# Patient Record
Sex: FEMALE | NOT HISPANIC OR LATINO | URBAN - METROPOLITAN AREA
[De-identification: names, ages, dates, MRNs, and addresses within clinical notes are randomized per-mention and may not be internally consistent; named-entity substitution may affect disease eponyms.]

---

## 2017-01-31 RX ORDER — SITAGLIPTIN 100 MG/1
TABLET, FILM COATED ORAL
Qty: 30 TAB | Refills: 1 | Status: SHIPPED | OUTPATIENT
Start: 2017-01-31 | End: 2017-04-16 | Stop reason: SDUPTHER

## 2017-01-31 NOTE — TELEPHONE ENCOUNTER
Patient has not been seen since 3/2016 and does not have appointment scheduled. Needs to be seen for diabetes.

## 2017-03-17 RX ORDER — NIFEDIPINE 90 MG/1
TABLET, FILM COATED, EXTENDED RELEASE ORAL
Qty: 90 TAB | Refills: 3 | Status: SHIPPED | OUTPATIENT
Start: 2017-03-17

## 2017-04-17 RX ORDER — SITAGLIPTIN 100 MG/1
TABLET, FILM COATED ORAL
Qty: 30 TAB | Refills: 5 | Status: SHIPPED | OUTPATIENT
Start: 2017-04-17 | End: 2017-04-27 | Stop reason: SDUPTHER

## 2017-04-27 RX ORDER — SITAGLIPTIN 100 MG/1
TABLET, FILM COATED ORAL
Qty: 30 TAB | Refills: 1 | Status: SHIPPED | OUTPATIENT
Start: 2017-04-27

## 2017-07-24 RX ORDER — ERGOCALCIFEROL 1.25 MG/1
CAPSULE ORAL
Qty: 24 CAP | Refills: 3 | Status: SHIPPED | OUTPATIENT
Start: 2017-07-24

## 2017-08-28 NOTE — PATIENT DISCUSSION
(U01.298) Keratoconjunct sicca, not specified as Sjogren's, bilateral - Assesment : Examination revealed Dry Eye Syndrome OU. - Plan : Recommend artificial tears 2-3 times daily and genteal gel drops QHS OU. Monitor for changes. Advised patient to call our office with decreased vision or increased symptoms.   RV 1 year Exam.

## 2017-08-28 NOTE — PATIENT DISCUSSION
(S96.022) Vitreous degeneration, bilateral - Assesment : Examination revealed PVD OU. - Plan : Monitor for changes. Advised patient to call our office with decreased vision or an increase in flashes and/or floaters.

## 2017-09-06 RX ORDER — ATORVASTATIN CALCIUM 20 MG/1
TABLET, FILM COATED ORAL
Qty: 30 TAB | Refills: 5 | Status: SHIPPED | OUTPATIENT
Start: 2017-09-06

## 2018-11-21 ENCOUNTER — IMPORTED ENCOUNTER (OUTPATIENT)
Dept: URBAN - METROPOLITAN AREA CLINIC 1 | Facility: CLINIC | Age: 58
End: 2018-11-21

## 2018-11-21 PROBLEM — Z79.84: Noted: 2018-11-21

## 2018-11-21 PROBLEM — H25.813: Noted: 2018-11-21

## 2018-11-21 PROBLEM — E11.9: Noted: 2018-11-21

## 2018-11-21 PROBLEM — H43.811: Noted: 2018-11-21

## 2018-11-21 PROCEDURE — 92004 COMPRE OPH EXAM NEW PT 1/>: CPT

## 2018-11-21 PROCEDURE — 92015 DETERMINE REFRACTIVE STATE: CPT

## 2018-11-26 NOTE — PATIENT DISCUSSION
(M23.814) Keratoconjunct sicca, not specified as Sjogren's, bilateral - Assesment : Examination revealed Dry Eye Syndrome OU. - Plan : Recommend continue  artificial tears 2-3 times daily and genteal gel drops QHS OU. Monitor for changes. Advised patient to call our office with decreased vision or increased symptoms.    RTC 1 year/EXAM

## 2018-11-26 NOTE — PATIENT DISCUSSION
(B95.358) Other secondary cataract, bilateral - Assesment : Significant posterior capsule opacification present OU. Patient is currently asymptomatic and functioning well. - Plan : Monitor for Changes. Advised patient to call our office with decreased vision or increased symptoms.

## 2018-11-26 NOTE — PATIENT DISCUSSION
(H45.074) Vitreous degeneration, right eye - Assesment : Examination revealed a posterior vitreous detachment OU. - Plan : Handouts given on posterior vitreous detachment and risk factors discussed for retinal detachment development. Advised to call immediately with any changes.

## 2019-12-26 ENCOUNTER — IMPORTED ENCOUNTER (OUTPATIENT)
Dept: URBAN - METROPOLITAN AREA CLINIC 1 | Facility: CLINIC | Age: 59
End: 2019-12-26

## 2019-12-26 PROBLEM — H25.813: Noted: 2019-12-26

## 2019-12-26 PROBLEM — E11.9: Noted: 2019-12-26

## 2019-12-26 PROBLEM — Z79.84: Noted: 2019-12-26

## 2019-12-26 PROBLEM — H43.811: Noted: 2019-12-26

## 2019-12-26 PROCEDURE — 92014 COMPRE OPH EXAM EST PT 1/>: CPT

## 2019-12-26 PROCEDURE — 92015 DETERMINE REFRACTIVE STATE: CPT

## 2019-12-26 NOTE — PATIENT DISCUSSION
1.  DM Type II (Oral Medication) without sign of diabetic retinopathy and no blot heme on dilated retinal examination today OU No Macular Edema:  Discussed the pathophysiology of diabetes and its effect on the eye and risk of blindness. Stressed the importance of strong glucose control. Advised of importance of at least yearly dilated examinations but to contact us immediately for any problems or concerns. 2. Cataract OU: Observe for now without intervention. The patient was advised to contact us if any change or worsening of vision3. PVD w/o Tear OD - RD precautions. MRX for glasses given. Return for an appointment in 1 year 30/glare with Dr. Sara Frederick.

## 2020-08-27 ENCOUNTER — TELEPHONE (OUTPATIENT)
Dept: INTERNAL MEDICINE CLINIC | Age: 60
End: 2020-08-27

## 2020-12-28 ENCOUNTER — IMPORTED ENCOUNTER (OUTPATIENT)
Dept: URBAN - METROPOLITAN AREA CLINIC 1 | Facility: CLINIC | Age: 60
End: 2020-12-28

## 2020-12-28 PROBLEM — H43.811: Noted: 2020-12-28

## 2020-12-28 PROBLEM — H25.813: Noted: 2020-12-28

## 2020-12-28 PROBLEM — Z79.84: Noted: 2020-12-28

## 2020-12-28 PROBLEM — E11.9: Noted: 2020-12-28

## 2020-12-28 PROCEDURE — 92015 DETERMINE REFRACTIVE STATE: CPT

## 2020-12-28 PROCEDURE — 92014 COMPRE OPH EXAM EST PT 1/>: CPT

## 2020-12-28 NOTE — PATIENT DISCUSSION
1.  DM Type II without sign of diabetic retinopathy and no blot heme on dilated retinal examination today OU No Macular Edema:  Discussed the pathophysiology of diabetes and its effect on the eye and risk of blindness. Stressed the importance of strong glucose control. Advised of importance of at least yearly dilated examinations but to contact us immediately for any problems or concerns. 2. Cataract OU: Observe for now without intervention. The patient was advised to contact us if any change or worsening of vision3. PVD w/o Tear OD - RD precautions. 4.  Return for an appointment in 1 year for 30 glare with Dr. Keily Melendez.

## 2022-01-06 ENCOUNTER — IMPORTED ENCOUNTER (OUTPATIENT)
Dept: URBAN - METROPOLITAN AREA CLINIC 1 | Facility: CLINIC | Age: 62
End: 2022-01-06

## 2022-01-06 PROBLEM — Z79.84: Noted: 2022-01-06

## 2022-01-06 PROBLEM — H25.813: Noted: 2022-01-06

## 2022-01-06 PROBLEM — H43.811: Noted: 2022-01-06

## 2022-01-06 PROBLEM — E11.9: Noted: 2022-01-06

## 2022-01-06 PROCEDURE — 92015 DETERMINE REFRACTIVE STATE: CPT

## 2022-01-06 PROCEDURE — 92014 COMPRE OPH EXAM EST PT 1/>: CPT

## 2022-01-06 NOTE — PATIENT DISCUSSION
1.  DM Type II (oral meds) without sign of diabetic retinopathy and no blot heme on dilated retinal examination today OU No Macular Edema:  Discussed the pathophysiology of diabetes and its effect on the eye and risk of blindness. Stressed the importance of strong glucose control. Advised of importance of at least yearly dilated examinations but to contact us immediately for any problems or concerns. 2. Cataract OU - Visually Significant secondary to glare discussed the risks benefits alternatives and limitations of cataract surgery. The patient stated a full understanding and would like to hold on surgery at this time. PT can call if desires and patient will need to return for preop appointment with cataract measurements and to have any additional questions answered and start pre-operative eye drops as directed. Phaco PCL OS then ODOtherwise follow-up 6 months for DFE/glare. 3. PVD w/o Tear OD - old/stable. Return for an appointment in 6 months for DFE/glare with Dr. Divine Alford.

## 2022-04-02 ASSESSMENT — VISUAL ACUITY
OD_GLARE: 20/150
OS_CC: J1+
OS_GLARE: 20/100
OD_CC: J1
OD_CC: J1
OS_SC: 20/25
OS_CC: J1
OS_SC: 20/25
OD_GLARE: 20/100
OS_GLARE: 20/150
OS_SC: 20/25
OD_GLARE: 20/100
OS_SC: 20/25
OD_SC: 20/25
OD_SC: 20/25
OS_GLARE: 20/100
OD_SC: 20/25
OS_GLARE: 20/150
OD_CC: J1+
OD_SC: 20/20-1
OS_CC: J1
OD_GLARE: 20/150

## 2022-04-02 ASSESSMENT — TONOMETRY
OS_IOP_MMHG: 16
OS_IOP_MMHG: 16
OD_IOP_MMHG: 15
OS_IOP_MMHG: 15
OD_IOP_MMHG: 16
OD_IOP_MMHG: 15
OS_IOP_MMHG: 16
OD_IOP_MMHG: 16

## 2022-07-07 ENCOUNTER — FOLLOW UP (OUTPATIENT)
Dept: URBAN - METROPOLITAN AREA CLINIC 1 | Facility: CLINIC | Age: 62
End: 2022-07-07

## 2022-07-07 DIAGNOSIS — H25.813: ICD-10-CM

## 2022-07-07 PROCEDURE — 92015 DETERMINE REFRACTIVE STATE: CPT

## 2022-07-07 PROCEDURE — 99213 OFFICE O/P EST LOW 20 MIN: CPT

## 2022-07-07 ASSESSMENT — VISUAL ACUITY
OS_CC: 20/30
OD_BAT: 20/100
OD_CC: 20/40
OS_BAT: 20/100

## 2022-07-07 ASSESSMENT — TONOMETRY
OD_IOP_MMHG: 15
OS_IOP_MMHG: 15

## 2022-07-15 ENCOUNTER — PRE-OP/H&P (OUTPATIENT)
Dept: URBAN - METROPOLITAN AREA CLINIC 1 | Facility: CLINIC | Age: 62
End: 2022-07-15

## 2022-07-15 VITALS
SYSTOLIC BLOOD PRESSURE: 164 MMHG | HEART RATE: 69 BPM | WEIGHT: 152 LBS | BODY MASS INDEX: 26.93 KG/M2 | DIASTOLIC BLOOD PRESSURE: 76 MMHG | HEIGHT: 63 IN

## 2022-07-15 DIAGNOSIS — H25.813: ICD-10-CM

## 2022-07-15 PROCEDURE — 92136 OPHTHALMIC BIOMETRY: CPT

## 2022-07-15 PROCEDURE — 92025 CPTRIZED CORNEAL TOPOGRAPHY: CPT

## 2022-07-15 PROCEDURE — 99499 UNLISTED E&M SERVICE: CPT

## 2022-07-15 ASSESSMENT — VISUAL ACUITY
OD_CC: 20/40
OS_BAT: 20/100
OD_BAT: 20/100
OS_CC: 20/40

## 2022-07-15 ASSESSMENT — KERATOMETRY
OS_K1POWER_DIOPTERS: 45.50
OS_AXISANGLE_DEGREES: 141
OD_K1POWER_DIOPTERS: 45.75
OD_K2POWER_DIOPTERS: 46.75
OD_AXISANGLE2_DEGREES: 146
OS_AXISANGLE2_DEGREES: 51
OD_AXISANGLE_DEGREES: 056
OS_K2POWER_DIOPTERS: 46.50

## 2022-07-15 ASSESSMENT — TONOMETRY
OS_IOP_MMHG: 15
OD_IOP_MMHG: 15

## 2022-07-18 NOTE — PATIENT DISCUSSION
1.  DM Type II without sign of diabetic retinopathy and no blot heme on dilated retinal examination today OU No Macular Edema. Discussed the pathophysiology of diabetes and its effect on the eye and risk of blindness. Stressed the importance of strong glucose control. Advised of importance of at least yearly dilated examinations but to contact us immediately for any problems or concerns. 2. Type II diabetes controlled by oral medications. 3.  PVD w/o Tear OD- RD precautions. Condition discussed w/ pt and reassurance given. Observe. 4. Cataract OU: Observe for now without intervention. The patient was advised to contact us if any change or worsening of vision5. Return for an appointment for 30/glare in 1 year with Dr. Mayra Mace. Double O-Z Flap Text: The defect edges were debeveled with a #15 scalpel blade.  Given the location of the defect, shape of the defect and the proximity to free margins a Double O-Z flap was deemed most appropriate.  Using a sterile surgical marker, an appropriate transposition flap was drawn incorporating the defect and placing the expected incisions within the relaxed skin tension lines where possible. The area thus outlined was incised deep to adipose tissue with a #15 scalpel blade.  The skin margins were undermined to an appropriate distance in all directions utilizing iris scissors.

## 2022-08-03 ENCOUNTER — SURGERY/PROCEDURE (OUTPATIENT)
Dept: URBAN - METROPOLITAN AREA SURGERY 1 | Facility: SURGERY | Age: 62
End: 2022-08-03

## 2022-08-03 DIAGNOSIS — H25.812: ICD-10-CM

## 2022-08-03 PROBLEM — Z96.1: Noted: 2022-08-03

## 2022-08-03 PROBLEM — Z96.1: Noted: 2022-08-17

## 2022-08-03 PROCEDURE — 66984 XCAPSL CTRC RMVL W/O ECP: CPT

## 2022-08-03 ASSESSMENT — KERATOMETRY
OD_AXISANGLE_DEGREES: 056
OS_K1POWER_DIOPTERS: 45.50
OS_K2POWER_DIOPTERS: 46.50
OS_AXISANGLE2_DEGREES: 51
OD_AXISANGLE2_DEGREES: 146
OS_AXISANGLE_DEGREES: 141
OD_K1POWER_DIOPTERS: 45.75
OD_K2POWER_DIOPTERS: 46.75

## 2022-08-04 ENCOUNTER — POST-OP (OUTPATIENT)
Dept: URBAN - METROPOLITAN AREA CLINIC 1 | Facility: CLINIC | Age: 62
End: 2022-08-04

## 2022-08-04 DIAGNOSIS — Z96.1: ICD-10-CM

## 2022-08-04 PROCEDURE — 99024 POSTOP FOLLOW-UP VISIT: CPT

## 2022-08-04 ASSESSMENT — KERATOMETRY
OD_AXISANGLE_DEGREES: 056
OS_K1POWER_DIOPTERS: 45.50
OS_AXISANGLE_DEGREES: 141
OD_AXISANGLE2_DEGREES: 146
OS_AXISANGLE2_DEGREES: 51
OD_K1POWER_DIOPTERS: 45.75
OS_K2POWER_DIOPTERS: 46.50
OD_K2POWER_DIOPTERS: 46.75

## 2022-08-04 ASSESSMENT — VISUAL ACUITY
OS_PH: 20/25-1
OS_SC: 20/50

## 2022-08-04 ASSESSMENT — TONOMETRY: OS_IOP_MMHG: 15

## 2022-08-04 NOTE — PATIENT DISCUSSION
Use combination drop (pred/brom/moxi) through completion of PO gtt chart regimen/ Per our instructions given to patient.

## 2022-08-11 ENCOUNTER — POST OP/EVAL OF SECOND EYE (OUTPATIENT)
Dept: URBAN - METROPOLITAN AREA CLINIC 1 | Facility: CLINIC | Age: 62
End: 2022-08-11

## 2022-08-11 VITALS
BODY MASS INDEX: 26.93 KG/M2 | DIASTOLIC BLOOD PRESSURE: 81 MMHG | SYSTOLIC BLOOD PRESSURE: 141 MMHG | WEIGHT: 152 LBS | HEART RATE: 74 BPM | HEIGHT: 63 IN

## 2022-08-11 DIAGNOSIS — Z96.1: ICD-10-CM

## 2022-08-11 DIAGNOSIS — H25.811: ICD-10-CM

## 2022-08-11 PROCEDURE — 99024 POSTOP FOLLOW-UP VISIT: CPT

## 2022-08-11 PROCEDURE — 92136 OPHTHALMIC BIOMETRY: CPT

## 2022-08-11 PROCEDURE — 92025 CPTRIZED CORNEAL TOPOGRAPHY: CPT

## 2022-08-11 ASSESSMENT — KERATOMETRY
OD_AXISANGLE_DEGREES: 056
OD_K1POWER_DIOPTERS: 45.75
OS_K2POWER_DIOPTERS: 46.50
OS_AXISANGLE2_DEGREES: 51
OS_K1POWER_DIOPTERS: 45.50
OS_AXISANGLE_DEGREES: 141
OD_K2POWER_DIOPTERS: 46.75
OD_AXISANGLE2_DEGREES: 146

## 2022-08-11 ASSESSMENT — TONOMETRY: OS_IOP_MMHG: 16

## 2022-08-11 ASSESSMENT — VISUAL ACUITY: OS_SC: 20/60

## 2022-08-11 NOTE — PATIENT DISCUSSION
Visually Significant Secondary to glare, discussed the risks, benefits, alternatives, and limitations of cataract surgery. The patient stated a full understanding and a desire to proceed with the procedure. Discussed with patient if post-op drops are more than $120 for all three combined when filling at their Pharmacy, please call our office to request generic substitutions. Patient understands she will need specs post-op to achieve best corrected vision.

## 2022-08-17 ENCOUNTER — SURGERY/PROCEDURE (OUTPATIENT)
Dept: URBAN - METROPOLITAN AREA SURGERY 1 | Facility: SURGERY | Age: 62
End: 2022-08-17

## 2022-08-17 DIAGNOSIS — H25.811: ICD-10-CM

## 2022-08-17 PROBLEM — Z96.1: Noted: 2022-08-17

## 2022-08-17 PROCEDURE — 66984 XCAPSL CTRC RMVL W/O ECP: CPT

## 2022-08-17 ASSESSMENT — KERATOMETRY
OS_K1POWER_DIOPTERS: 45.50
OS_AXISANGLE_DEGREES: 141
OS_K2POWER_DIOPTERS: 46.50
OD_AXISANGLE2_DEGREES: 146
OD_K2POWER_DIOPTERS: 46.75
OS_AXISANGLE2_DEGREES: 51
OD_AXISANGLE_DEGREES: 056
OD_K1POWER_DIOPTERS: 45.75

## 2022-08-18 ENCOUNTER — POST-OP (OUTPATIENT)
Dept: URBAN - METROPOLITAN AREA CLINIC 1 | Facility: CLINIC | Age: 62
End: 2022-08-18

## 2022-08-18 DIAGNOSIS — Z96.1: ICD-10-CM

## 2022-08-18 PROCEDURE — 99024 POSTOP FOLLOW-UP VISIT: CPT

## 2022-08-18 ASSESSMENT — KERATOMETRY
OS_K1POWER_DIOPTERS: 45.50
OD_K2POWER_DIOPTERS: 46.75
OD_K1POWER_DIOPTERS: 45.75
OD_AXISANGLE2_DEGREES: 146
OS_AXISANGLE_DEGREES: 141
OS_AXISANGLE2_DEGREES: 51
OS_K2POWER_DIOPTERS: 46.50
OD_AXISANGLE_DEGREES: 056

## 2022-08-18 ASSESSMENT — TONOMETRY
OD_IOP_MMHG: 20
OS_IOP_MMHG: 16

## 2022-08-18 ASSESSMENT — VISUAL ACUITY
OD_SC: J3
OS_SC: J2
OS_SC: 20/80
OD_SC: 20/150

## 2022-09-08 ENCOUNTER — POST-OP (OUTPATIENT)
Dept: URBAN - METROPOLITAN AREA CLINIC 1 | Facility: CLINIC | Age: 62
End: 2022-09-08

## 2022-09-08 DIAGNOSIS — Z96.1: ICD-10-CM

## 2022-09-08 PROCEDURE — 99024 POSTOP FOLLOW-UP VISIT: CPT

## 2022-09-08 ASSESSMENT — KERATOMETRY
OD_AXISANGLE2_DEGREES: 146
OS_K1POWER_DIOPTERS: 45.50
OD_K2POWER_DIOPTERS: 46.75
OD_K1POWER_DIOPTERS: 45.75
OD_AXISANGLE_DEGREES: 056
OS_AXISANGLE_DEGREES: 141
OS_AXISANGLE2_DEGREES: 51
OS_K2POWER_DIOPTERS: 46.50

## 2022-09-08 ASSESSMENT — TONOMETRY
OD_IOP_MMHG: 17
OS_IOP_MMHG: 16

## 2022-09-08 ASSESSMENT — VISUAL ACUITY
OD_SC: J2
OD_SC: 20/60+2
OS_SC: 20/70
OS_SC: J1

## 2023-07-11 ENCOUNTER — COMPREHENSIVE EXAM (OUTPATIENT)
Dept: URBAN - METROPOLITAN AREA CLINIC 1 | Facility: CLINIC | Age: 63
End: 2023-07-11

## 2023-07-11 DIAGNOSIS — E11.9: ICD-10-CM

## 2023-07-11 DIAGNOSIS — H43.813: ICD-10-CM

## 2023-07-11 DIAGNOSIS — H26.493: ICD-10-CM

## 2023-07-11 DIAGNOSIS — Z79.4: ICD-10-CM

## 2023-07-11 PROCEDURE — 92015 DETERMINE REFRACTIVE STATE: CPT

## 2023-07-11 PROCEDURE — 92014 COMPRE OPH EXAM EST PT 1/>: CPT

## 2023-07-11 ASSESSMENT — KERATOMETRY
OS_AXISANGLE2_DEGREES: 51
OS_K2POWER_DIOPTERS: 46.50
OD_K1POWER_DIOPTERS: 45.75
OS_AXISANGLE_DEGREES: 141
OD_K2POWER_DIOPTERS: 46.75
OD_AXISANGLE_DEGREES: 056
OS_K1POWER_DIOPTERS: 45.50
OD_AXISANGLE2_DEGREES: 146

## 2023-07-11 ASSESSMENT — VISUAL ACUITY
OS_BAT: 20/50
OS_CC: 20/25
OD_BAT: 20/50
OD_CC: J1+
OS_CC: J1+
OD_CC: 20/25

## 2023-07-11 ASSESSMENT — TONOMETRY
OD_IOP_MMHG: 16
OS_IOP_MMHG: 16

## 2023-07-14 ENCOUNTER — CLINIC PROCEDURE ONLY (OUTPATIENT)
Dept: URBAN - METROPOLITAN AREA CLINIC 1 | Facility: CLINIC | Age: 63
End: 2023-07-14

## 2023-07-14 DIAGNOSIS — H26.493: ICD-10-CM

## 2023-07-14 DIAGNOSIS — Z96.1: ICD-10-CM

## 2023-07-14 PROCEDURE — 66821 AFTER CATARACT LASER SURGERY: CPT

## 2023-07-14 ASSESSMENT — KERATOMETRY
OS_K2POWER_DIOPTERS: 46.50
OS_K1POWER_DIOPTERS: 45.50
OD_AXISANGLE_DEGREES: 056
OD_AXISANGLE2_DEGREES: 146
OS_AXISANGLE_DEGREES: 141
OS_AXISANGLE2_DEGREES: 51
OD_K1POWER_DIOPTERS: 45.75
OD_K2POWER_DIOPTERS: 46.75

## 2023-07-28 ENCOUNTER — CLINIC PROCEDURE ONLY (OUTPATIENT)
Dept: URBAN - METROPOLITAN AREA CLINIC 1 | Facility: CLINIC | Age: 63
End: 2023-07-28

## 2023-07-28 DIAGNOSIS — Z96.1: ICD-10-CM

## 2023-07-28 DIAGNOSIS — H26.492: ICD-10-CM

## 2023-07-28 PROCEDURE — 66821 AFTER CATARACT LASER SURGERY: CPT

## 2023-07-28 ASSESSMENT — KERATOMETRY
OS_K2POWER_DIOPTERS: 46.50
OD_K1POWER_DIOPTERS: 45.75
OD_AXISANGLE_DEGREES: 056
OD_AXISANGLE2_DEGREES: 146
OD_K2POWER_DIOPTERS: 46.75
OS_K1POWER_DIOPTERS: 45.50
OS_AXISANGLE_DEGREES: 141
OS_AXISANGLE2_DEGREES: 51

## 2023-08-31 ENCOUNTER — POST-OP (OUTPATIENT)
Dept: URBAN - METROPOLITAN AREA CLINIC 1 | Facility: CLINIC | Age: 63
End: 2023-08-31

## 2023-08-31 DIAGNOSIS — Z98.890: ICD-10-CM

## 2023-08-31 PROCEDURE — 99024 POSTOP FOLLOW-UP VISIT: CPT

## 2023-08-31 ASSESSMENT — KERATOMETRY
OS_K2POWER_DIOPTERS: 46.50
OD_AXISANGLE_DEGREES: 056
OS_AXISANGLE_DEGREES: 141
OS_AXISANGLE2_DEGREES: 51
OD_AXISANGLE2_DEGREES: 146
OD_K2POWER_DIOPTERS: 46.75
OD_K1POWER_DIOPTERS: 45.75
OS_K1POWER_DIOPTERS: 45.50

## 2023-08-31 ASSESSMENT — TONOMETRY
OD_IOP_MMHG: 16
OS_IOP_MMHG: 16

## 2023-08-31 ASSESSMENT — VISUAL ACUITY
OS_CC: 20/25
OD_CC: 20/25

## 2024-07-29 ENCOUNTER — COMPREHENSIVE EXAM (OUTPATIENT)
Dept: URBAN - METROPOLITAN AREA CLINIC 1 | Facility: CLINIC | Age: 64
End: 2024-07-29

## 2024-07-29 DIAGNOSIS — H43.813: ICD-10-CM

## 2024-07-29 DIAGNOSIS — E11.9: ICD-10-CM

## 2024-07-29 DIAGNOSIS — Z96.1: ICD-10-CM

## 2024-07-29 DIAGNOSIS — Z79.4: ICD-10-CM

## 2024-07-29 PROCEDURE — 92015 DETERMINE REFRACTIVE STATE: CPT

## 2024-07-29 PROCEDURE — 99214 OFFICE O/P EST MOD 30 MIN: CPT

## 2024-07-29 ASSESSMENT — KERATOMETRY
OS_K1POWER_DIOPTERS: 45.50
OD_AXISANGLE2_DEGREES: 146
OD_AXISANGLE_DEGREES: 056
OS_AXISANGLE2_DEGREES: 51
OD_K1POWER_DIOPTERS: 45.75
OS_K2POWER_DIOPTERS: 46.50
OD_K2POWER_DIOPTERS: 46.75
OS_AXISANGLE_DEGREES: 141

## 2024-07-29 ASSESSMENT — VISUAL ACUITY
OS_CC: J1+
OS_CC: 20/20
OD_CC: 20/25
OD_CC: J1+

## 2024-07-29 ASSESSMENT — TONOMETRY
OD_IOP_MMHG: 15
OS_IOP_MMHG: 15

## 2025-07-30 ENCOUNTER — COMPREHENSIVE EXAM (OUTPATIENT)
Age: 65
End: 2025-07-30

## 2025-07-30 DIAGNOSIS — H43.813: ICD-10-CM

## 2025-07-30 DIAGNOSIS — Z96.1: ICD-10-CM

## 2025-07-30 DIAGNOSIS — E11.9: ICD-10-CM

## 2025-07-30 DIAGNOSIS — Z79.4: ICD-10-CM

## 2025-07-30 PROCEDURE — 99214 OFFICE O/P EST MOD 30 MIN: CPT
